# Patient Record
(demographics unavailable — no encounter records)

---

## 2025-04-29 NOTE — ASSESSMENT
[FreeTextEntry1] : DIAGNOSIS:    LUMBAR SPONDYLOSIS/STENOSIS     DISK DEGENERATION  TREATMENT PLAN:  NON-SURGICAL     This is a patient with degenerated lumbar disks with associated stenosis and spondylosis.  I have recommended nonsurgical management at this time.  This consists of physical therapy and/or manual medicine in conjunction to medical therapy and other conservative methods.  These include the consideration of trigger point injections and or the utilization of modalities such as TENS where applicable.  The next tier would be the referral to a pain management specialist (anesthesia or physiatry) for the consideration of spinal injections.  This includes the options of epidural steroids, facet injections as well as other novel techniques that may provide pain relief.  Although there is indeed evidence of disk degeneration on imaging, discectomy or spinal fusion for the sole purposes of treating back/leg pain in the absence of a compressive lesion or neurological issue is associated with poor outcomes.       I have reviewed the images in detail with the patient today in my office and have answered all questions regarding this condition to the best of my ability to the patients satisfaction.

## 2025-04-29 NOTE — REASON FOR VISIT
[Follow-Up: _____] : a [unfilled] follow-up visit [FreeTextEntry1] : Raquel is a pleasant 62-year-old here for evaluation of her lumbar spine.  She was seen in our office in the past and evaluated and treated conservatively she is here with new imaging done at [facility] after having done physical therapy and pain management treatments.  She states the pain management treatments were variable in their result however she felt like when she went to physical therapy her pain worsened for some reason.  She has no symptoms of cauda equina compression no symptoms of radiculopathy she has axial pain mostly.  She is currently using naproxen which she finds to be effective and robaxin gave her a rash.  Vlada Frankenberger Melanie Frank